# Patient Record
Sex: FEMALE | Race: WHITE | Employment: OTHER | URBAN - METROPOLITAN AREA
[De-identification: names, ages, dates, MRNs, and addresses within clinical notes are randomized per-mention and may not be internally consistent; named-entity substitution may affect disease eponyms.]

---

## 2017-10-09 ENCOUNTER — OFFICE VISIT (OUTPATIENT)
Dept: OBSTETRICS AND GYNECOLOGY | Facility: CLINIC | Age: 67
End: 2017-10-09

## 2017-10-09 VITALS
BODY MASS INDEX: 27.45 KG/M2 | WEIGHT: 160.8 LBS | SYSTOLIC BLOOD PRESSURE: 152 MMHG | DIASTOLIC BLOOD PRESSURE: 92 MMHG | HEIGHT: 64 IN

## 2017-10-09 DIAGNOSIS — N81.11 MIDLINE CYSTOCELE: ICD-10-CM

## 2017-10-09 DIAGNOSIS — M85.80 OSTEOPENIA, UNSPECIFIED LOCATION: ICD-10-CM

## 2017-10-09 DIAGNOSIS — Z78.0 MENOPAUSE: Primary | ICD-10-CM

## 2017-10-09 PROCEDURE — 99213 OFFICE O/P EST LOW 20 MIN: CPT | Performed by: OBSTETRICS & GYNECOLOGY

## 2017-10-09 RX ORDER — ALENDRONATE SODIUM 70 MG/1
70 TABLET ORAL WEEKLY
Qty: 12 TABLET | Refills: 3 | Status: SHIPPED | OUTPATIENT
Start: 2017-10-09 | End: 2018-10-01 | Stop reason: SDUPTHER

## 2017-10-09 RX ORDER — INFLUENZA A VIRUS A/MICHIGAN/45/2015 X-275 (H1N1) ANTIGEN (FORMALDEHYDE INACTIVATED), INFLUENZA A VIRUS A/SINGAPORE/INFIMH-16-0019/2016 IVR-186 (H3N2) ANTIGEN (FORMALDEHYDE INACTIVATED), AND INFLUENZA B VIRUS B/MARYLAND/15/2016 BX-69A (A B/COLORADO/6/2017-LIKE VIRUS) ANTIGEN (FORMALDEHYDE INACTIVATED) 60; 60; 60 UG/.5ML; UG/.5ML; UG/.5ML
INJECTION, SUSPENSION INTRAMUSCULAR
Refills: 0 | COMMUNITY
Start: 2017-10-05 | End: 2018-10-15

## 2017-10-09 NOTE — PROGRESS NOTES
Chief Complaint   Patient presents with   • Gynecologic Exam   • Med Refill       Josselyn Mosley is a 67 y.o. year old  presenting to be seen because of Menopause with osteopenia.  She is currently treated with alendronate 70 mg weekly and has no side effects.  She denies jaw pain or reflux.  She has previously had an AH/BSO.    History   Sexual Activity   • Sexual activity: Yes   • Partners: Male   • Birth control/ protection: Surgical, Post-menopausal                 SCREENING TESTS    Year 2012   Age                         PAP     Neg.                    HPV high risk                         Mammogram    benign benign                    ROBYN score                         Breast MRI                         Lipids                         Vitamin D                         Colonoscopy                         DEXA  Frax (hip/any)   osteopenia                      Ovarian Screen                             No Additional Complaints Reported    The following portions of the patient's history were reviewed and updated as appropriate:vital signs and   She  does not have any pertinent problems on file.  She  has a past surgical history that includes Total abdominal hysterectomy w/ bilateral salpingoophorectomy (Bilateral); Knee cartilage surgery (Left); and Cataract extraction w/ intraocular lens  implant, bilateral.  Her family history is not on file.  She  reports that she has never smoked. She has never used smokeless tobacco. She reports that she does not drink alcohol or use illicit drugs.  Current Outpatient Prescriptions   Medication Sig Dispense Refill   • alendronate (FOSAMAX) 70 MG tablet Take 1 tablet by mouth 1 (One) Time Per Week. 12 tablet 3   • aspirin 81 MG EC tablet Take 81 mg by mouth Daily.     • Calcium-Magnesium-Vitamin D (CALCIUM 1200+D3 PO) Take 1 tablet by mouth Daily.     •  "cholecalciferol (VITAMIN D3) 1000 UNITS tablet Take 1,000 Units by mouth Daily.     • FLUZONE HIGH-DOSE 0.5 ML suspension prefilled syringe injection ADM 0.5ML IM UTD  0   • metFORMIN (GLUCOPHAGE) 500 MG tablet Take 1 tablet by mouth Daily.     • Multiple Vitamins-Minerals (MULTIVITAMIN ADULT PO) Take 1 tablet by mouth Daily.     • NIFEDICAL XL 60 MG 24 hr tablet Take 1 tablet by mouth Daily.     • omeprazole (PriLOSEC) 20 MG capsule Take 1 capsule by mouth Daily.     • simvastatin (ZOCOR) 10 MG tablet Take 1 tablet by mouth Daily.     • valsartan-hydrochlorothiazide (DIOVAN-HCT) 320-12.5 MG per tablet Take 1 tablet by mouth Daily.       No current facility-administered medications for this visit.      She is allergic to levaquin [levofloxacin]..        Review of Systems  A comprehensive review of systems was done.  Constitutional: negative for fever, chills, activity change, appetite change, fatigue and unexpected weight change.  Respiratory: negative  Cardiovascular: negative  Gastrointestinal: negative  Genitourinary:negative  Musculoskeletal:negative  Behavioral/Psych: negative          /92  Ht 64\" (162.6 cm)  Wt 160 lb 12.8 oz (72.9 kg)  LMP  (LMP Unknown)  BMI 27.6 kg/m2    Physical Exam    General:  alert; cooperative; well developed; well nourished   Skin:  No suspicious lesions seen   Thyroid: normal to inspection and palpation   Lungs:  clear to auscultation bilaterally   Heart:  regular rate and rhythm, S1, S2 normal, no murmur, click, rub or gallop   Breasts:  Examined in supine position  Symmetric without masses or skin dimpling  Nipples normal without inversion, lesions or discharge  There are no palpable axillary nodes   Abdomen: soft, non-tender; no masses  no umbilical or inginual hernias are present  no hepato-splenomegaly   Pelvis: Clinical staff was present for exam  External genitalia:  normal appearance of the external genitalia including Bartholin's and [place]'s glands.  Vaginal:  " normal pink mucosa without prolapse or lesions.  Cervix:  absent.  Uterus:  absent.  Adnexa:  absent, bilateral.  Rectal:  anus visually normal appearing. recto-vaginal exam unremarkable and confirms findings;     Lab Review   No data reviewed    Imaging   Mammogram results- benign    Medical counseling was given to patient for the following topics: diagnostic results, instructions for management, risk factor reductions, risks and benefits of treatment options and importance of treatment compliance . Total time of the encounter was 18 minute(s) and 11 minute(s)  was spent in face to face counseling.  I have counseled the patient in fall prevention.  I have counseled the patient and continuation of alendronate weekly.  I have discussed potential side effects with her.  I counseled the patient in monthly self breast assessment and annual breast imaging.          ASSESSMENT  Problems Addressed this Visit        Musculoskeletal and Integument    Osteopenia    Relevant Medications    alendronate (FOSAMAX) 70 MG tablet       Genitourinary    Menopause - Primary    Midline cystocele            PLAN    Medications prescribed this encounter:    New Medications Ordered This Visit   Medications   • FLUZONE HIGH-DOSE 0.5 ML suspension prefilled syringe injection     Sig: ADM 0.5ML IM UTD     Refill:  0   • metFORMIN (GLUCOPHAGE) 500 MG tablet     Sig: Take 1 tablet by mouth Daily.   • alendronate (FOSAMAX) 70 MG tablet     Sig: Take 1 tablet by mouth 1 (One) Time Per Week.     Dispense:  12 tablet     Refill:  3   ·   · Follow up: 12 month(s)  · Calcium, 600 mg/ Vit. D, 400 IU daily     *Please note that portions of this documentation may have been completed with a voice recognition program.  Efforts were made to edit this dictation, but occasional words may have been mistranscribed.     This note was electronically signed.    CLYDE Gray MD  October 9, 2017  2:22 PM

## 2018-10-01 DIAGNOSIS — M85.80 OSTEOPENIA, UNSPECIFIED LOCATION: ICD-10-CM

## 2018-10-01 RX ORDER — ALENDRONATE SODIUM 70 MG/1
TABLET ORAL
Qty: 12 TABLET | Refills: 0 | Status: SHIPPED | OUTPATIENT
Start: 2018-10-01 | End: 2018-10-15 | Stop reason: SDUPTHER

## 2018-10-15 ENCOUNTER — OFFICE VISIT (OUTPATIENT)
Dept: OBSTETRICS AND GYNECOLOGY | Facility: CLINIC | Age: 68
End: 2018-10-15

## 2018-10-15 VITALS
DIASTOLIC BLOOD PRESSURE: 88 MMHG | SYSTOLIC BLOOD PRESSURE: 142 MMHG | BODY MASS INDEX: 28.24 KG/M2 | WEIGHT: 165.4 LBS | HEIGHT: 64 IN

## 2018-10-15 DIAGNOSIS — Z01.419 ENCOUNTER FOR GYNECOLOGICAL EXAMINATION WITHOUT ABNORMAL FINDING: ICD-10-CM

## 2018-10-15 DIAGNOSIS — M85.80 OSTEOPENIA, UNSPECIFIED LOCATION: ICD-10-CM

## 2018-10-15 DIAGNOSIS — Z78.0 MENOPAUSE: Primary | ICD-10-CM

## 2018-10-15 DIAGNOSIS — M85.89 OSTEOPENIA OF MULTIPLE SITES: ICD-10-CM

## 2018-10-15 PROCEDURE — G0101 CA SCREEN;PELVIC/BREAST EXAM: HCPCS | Performed by: OBSTETRICS & GYNECOLOGY

## 2018-10-15 RX ORDER — OLMESARTAN MEDOXOMIL AND HYDROCHLOROTHIAZIDE 40/12.5 40; 12.5 MG/1; MG/1
1 TABLET ORAL DAILY
COMMUNITY
Start: 2018-07-24

## 2018-10-15 RX ORDER — ALENDRONATE SODIUM 70 MG/1
70 TABLET ORAL
Qty: 4 TABLET | Refills: 11 | Status: SHIPPED | OUTPATIENT
Start: 2018-10-15 | End: 2019-10-15

## 2018-10-15 NOTE — PROGRESS NOTES
Chief Complaint   Patient presents with   • Gynecologic Exam   • Otilio Bragg       Josselyn Mosley is a 68 y.o. year old  presenting to be seen for her annual exam.  This patient has previously had an AH/BSO.  She has a history of moderate to severe osteopenia of the spine and hip.  She is currently taking alendronate, 70 mg weekly.  Her DEXA on 10/11/2018 revealed slight worsening at the left hip.  She is exercising and taking calcium with vitamin D.  She has no bowel or urinary symptoms.  She denies abnormal vaginal discharge.    SCREENING TESTS    Year 2012   Age                         PAP                         HPV high risk                         Mammogram      benign                   ROBYN score                         Breast MRI                         Lipids                         Vitamin D                         Colonoscopy                         DEXA  Frax (hip/any)     osteopenia  osteopenia                  Ovarian Screen                             She exercises regularly: yes.  She wears her seat belt: yes.  She has concerns about domestic violence: no.  She has noticed changes in height: no    GYN screening history:  · Last mammogram: was done on approximately 2017 and the result was: Birads II (Benign findings).  · Last DEXA: was done on approximately 10/11/2018 and the results were: osteopenia of hips and osteopenia of spine.    No Additional Complaints Reported    The following portions of the patient's history were reviewed and updated as appropriate:vital signs and   She  has a past medical history of Cystocele; Hemorrhoids; Hyperlipidemia; Hypertension; Menopause; Osteopenia; and Prediabetes.  She  does not have any pertinent problems on file.  She  has a past surgical history that includes Total abdominal hysterectomy w/ bilateral salpingoophorectomy (Bilateral); Knee  "cartilage surgery (Left); and Cataract extraction w/ intraocular lens  implant, bilateral.  Her family history is not on file.  She  reports that she has never smoked. She has never used smokeless tobacco. She reports that she does not drink alcohol or use drugs.  Current Outpatient Prescriptions   Medication Sig Dispense Refill   • alendronate (FOSAMAX) 70 MG tablet Take 1 tablet by mouth Every 7 (Seven) Days. 4 tablet 11   • aspirin 81 MG EC tablet Take 81 mg by mouth Daily.     • Calcium-Magnesium-Vitamin D (CALCIUM 1200+D3 PO) Take 1 tablet by mouth Daily.     • cholecalciferol (VITAMIN D3) 1000 UNITS tablet Take 1,000 Units by mouth Daily.     • metFORMIN (GLUCOPHAGE) 500 MG tablet Take 1 tablet by mouth Daily.     • Multiple Vitamins-Minerals (MULTIVITAMIN ADULT PO) Take 1 tablet by mouth Daily.     • NIFEDICAL XL 60 MG 24 hr tablet Take 1 tablet by mouth Daily.     • olmesartan-hydrochlorothiazide (BENICAR HCT) 40-12.5 MG per tablet Take 1 tablet by mouth Daily.     • omeprazole (PriLOSEC) 20 MG capsule Take 1 capsule by mouth Daily.     • simvastatin (ZOCOR) 10 MG tablet Take 1 tablet by mouth Daily.       No current facility-administered medications for this visit.      She is allergic to levaquin [levofloxacin]..    Review of Systems  A comprehensive review of systems was taken.  Constitutional: negative for fever, chills, activity change, appetite change, fatigue and unexpected weight change.  Respiratory: negative  Cardiovascular: negative  Gastrointestinal: negative  Genitourinary:negative  Musculoskeletal:negative  Behavioral/Psych: negative       /88   Ht 162.6 cm (64\")   Wt 75 kg (165 lb 6.4 oz)   LMP  (LMP Unknown)   BMI 28.39 kg/m²     Physical Exam    General:  alert; cooperative; well developed; well nourished   Skin:  No suspicious lesions seen   Thyroid: normal to inspection and palpation   Lungs:  clear to auscultation bilaterally   Heart:  regular rate and rhythm, S1, S2 normal, no " murmur, click, rub or gallop   Breasts:  Examined in supine position  Symmetric without masses or skin dimpling  Nipples normal without inversion, lesions or discharge  There are no palpable axillary nodes   Abdomen: soft, non-tender; no masses  no umbilical or inginual hernias are present  no hepato-splenomegaly   Pelvis: Clinical staff was present for exam  External genitalia:  normal appearance of the external genitalia including Bartholin's and [place]'s glands.  Vaginal:  normal pink mucosa without prolapse or lesions.  Cervix:  absent.  Uterus:  absent.  Adnexa:  absent, bilateral.  Rectal:  anus visually normal appearing. recto-vaginal exam unremarkable and confirms findings;  Cystocele GRADE 1     Lab Review   No data reviewed    Imaging  Mammogram results- Birads II,  and DEXA- severe osteopenia of the left hip and moderate osteopenia of the  spine         ASSESSMENT  Problems Addressed this Visit        Musculoskeletal and Integument    Osteopenia    Relevant Medications    alendronate (FOSAMAX) 70 MG tablet       Genitourinary    Menopause - Primary      Other Visit Diagnoses     Encounter for gynecological examination without abnormal finding              PLAN    Medications prescribed this encounter:    New Medications Ordered This Visit   Medications   • alendronate (FOSAMAX) 70 MG tablet     Sig: Take 1 tablet by mouth Every 7 (Seven) Days.     Dispense:  4 tablet     Refill:  11   · Repeat DEXA in 2 years  · Monthly self breast assessment and annual breast imaging  · Calcium, 600 mg/ Vit. D, 400 IU daily; regular weight-bearing exercise  · Follow up: 12 month(s)  *Please note that portions of this documentation may have been completed with a voice recognition program.  Efforts were made to edit this dictation, but occasional words may have been mistranscribed.       This note was electronically signed.    CLYDE Gray MD  October 15, 2018  2:21 PM

## 2018-10-15 NOTE — PATIENT INSTRUCTIONS
Fall Prevention in the Home  Falls can cause injuries. They can happen to people of all ages. There are many things you can do to make your home safe and to help prevent falls.  What can I do on the outside of my home?  · Regularly fix the edges of walkways and driveways and fix any cracks.  · Remove anything that might make you trip as you walk through a door, such as a raised step or threshold.  · Trim any bushes or trees on the path to your home.  · Use bright outdoor lighting.  · Clear any walking paths of anything that might make someone trip, such as rocks or tools.  · Regularly check to see if handrails are loose or broken. Make sure that both sides of any steps have handrails.  · Any raised decks and porches should have guardrails on the edges.  · Have any leaves, snow, or ice cleared regularly.  · Use sand or salt on walking paths during winter.  · Clean up any spills in your garage right away. This includes oil or grease spills.  What can I do in the bathroom?  · Use night lights.  · Install grab bars by the toilet and in the tub and shower. Do not use towel bars as grab bars.  · Use non-skid mats or decals in the tub or shower.  · If you need to sit down in the shower, use a plastic, non-slip stool.  · Keep the floor dry. Clean up any water that spills on the floor as soon as it happens.  · Remove soap buildup in the tub or shower regularly.  · Attach bath mats securely with double-sided non-slip rug tape.  · Do not have throw rugs and other things on the floor that can make you trip.  What can I do in the bedroom?  · Use night lights.  · Make sure that you have a light by your bed that is easy to reach.  · Do not use any sheets or blankets that are too big for your bed. They should not hang down onto the floor.  · Have a firm chair that has side arms. You can use this for support while you get dressed.  · Do not have throw rugs and other things on the floor that can make you trip.  What can I do in the  kitchen?  · Clean up any spills right away.  · Avoid walking on wet floors.  · Keep items that you use a lot in easy-to-reach places.  · If you need to reach something above you, use a strong step stool that has a grab bar.  · Keep electrical cords out of the way.  · Do not use floor polish or wax that makes floors slippery. If you must use wax, use non-skid floor wax.  · Do not have throw rugs and other things on the floor that can make you trip.  What can I do with my stairs?  · Do not leave any items on the stairs.  · Make sure that there are handrails on both sides of the stairs and use them. Fix handrails that are broken or loose. Make sure that handrails are as long as the stairways.  · Check any carpeting to make sure that it is firmly attached to the stairs. Fix any carpet that is loose or worn.  · Avoid having throw rugs at the top or bottom of the stairs. If you do have throw rugs, attach them to the floor with carpet tape.  · Make sure that you have a light switch at the top of the stairs and the bottom of the stairs. If you do not have them, ask someone to add them for you.  What else can I do to help prevent falls?  · Wear shoes that:  ? Do not have high heels.  ? Have rubber bottoms.  ? Are comfortable and fit you well.  ? Are closed at the toe. Do not wear sandals.  · If you use a stepladder:  ? Make sure that it is fully opened. Do not climb a closed stepladder.  ? Make sure that both sides of the stepladder are locked into place.  ? Ask someone to hold it for you, if possible.  · Clearly viktor and make sure that you can see:  ? Any grab bars or handrails.  ? First and last steps.  ? Where the edge of each step is.  · Use tools that help you move around (mobility aids) if they are needed. These include:  ? Canes.  ? Walkers.  ? Scooters.  ? Crutches.  · Turn on the lights when you go into a dark area. Replace any light bulbs as soon as they burn out.  · Set up your furniture so you have a clear path.  Avoid moving your furniture around.  · If any of your floors are uneven, fix them.  · If there are any pets around you, be aware of where they are.  · Review your medicines with your doctor. Some medicines can make you feel dizzy. This can increase your chance of falling.  Ask your doctor what other things that you can do to help prevent falls.  This information is not intended to replace advice given to you by your health care provider. Make sure you discuss any questions you have with your health care provider.  Document Released: 10/14/2010 Document Revised: 05/25/2017 Document Reviewed: 01/22/2016  [employer]vier Interactive Patient Education © 2018 [employer]

## 2018-11-08 DIAGNOSIS — M85.89 OSTEOPENIA OF MULTIPLE SITES: Primary | ICD-10-CM

## 2019-10-29 ENCOUNTER — OFFICE VISIT (OUTPATIENT)
Dept: OBSTETRICS AND GYNECOLOGY | Facility: CLINIC | Age: 69
End: 2019-10-29

## 2019-10-29 VITALS
SYSTOLIC BLOOD PRESSURE: 160 MMHG | BODY MASS INDEX: 28.65 KG/M2 | DIASTOLIC BLOOD PRESSURE: 80 MMHG | HEIGHT: 64 IN | WEIGHT: 167.8 LBS

## 2019-10-29 DIAGNOSIS — Z01.419 ENCOUNTER FOR GYNECOLOGICAL EXAMINATION WITHOUT ABNORMAL FINDING: ICD-10-CM

## 2019-10-29 DIAGNOSIS — Z78.0 MENOPAUSE: Primary | ICD-10-CM

## 2019-10-29 DIAGNOSIS — M85.89 OSTEOPENIA OF MULTIPLE SITES: ICD-10-CM

## 2019-10-29 DIAGNOSIS — N81.11 MIDLINE CYSTOCELE: ICD-10-CM

## 2019-10-29 PROCEDURE — G0101 CA SCREEN;PELVIC/BREAST EXAM: HCPCS | Performed by: OBSTETRICS & GYNECOLOGY

## 2019-10-29 RX ORDER — ALENDRONATE SODIUM 70 MG/1
70 TABLET ORAL
COMMUNITY
End: 2019-10-29 | Stop reason: SDUPTHER

## 2019-10-29 RX ORDER — ZOSTER VACCINE RECOMBINANT, ADJUVANTED 50 MCG/0.5
KIT INTRAMUSCULAR
Refills: 0 | COMMUNITY
Start: 2019-10-26 | End: 2023-03-30

## 2019-10-29 RX ORDER — CLOSTRIDIUM TETANI TOXOID ANTIGEN (FORMALDEHYDE INACTIVATED) AND CORYNEBACTERIUM DIPHTHERIAE TOXOID ANTIGEN (FORMALDEHYDE INACTIVATED) 5; 2 [LF]/.5ML; [LF]/.5ML
INJECTION, SUSPENSION INTRAMUSCULAR
Refills: 0 | COMMUNITY
Start: 2019-09-26 | End: 2022-03-29

## 2019-10-29 RX ORDER — INFLUENZA A VIRUS A/MICHIGAN/45/2015 X-275 (H1N1) ANTIGEN (FORMALDEHYDE INACTIVATED), INFLUENZA A VIRUS A/SINGAPORE/INFIMH-16-0019/2016 IVR-186 (H3N2) ANTIGEN (FORMALDEHYDE INACTIVATED), AND INFLUENZA B VIRUS B/MARYLAND/15/2016 BX-69A (A B/COLORADO/6/2017-LIKE VIRUS) ANTIGEN (FORMALDEHYDE INACTIVATED) 60; 60; 60 UG/.5ML; UG/.5ML; UG/.5ML
INJECTION, SUSPENSION INTRAMUSCULAR
Refills: 0 | COMMUNITY
Start: 2019-10-26 | End: 2020-11-11

## 2019-10-29 RX ORDER — ALENDRONATE SODIUM 70 MG/1
70 TABLET ORAL
Qty: 12 TABLET | Refills: 3 | Status: SHIPPED | OUTPATIENT
Start: 2019-10-29 | End: 2020-10-28

## 2019-10-29 NOTE — PROGRESS NOTES
Chief Complaint   Patient presents with   • Gynecologic Exam   • Med Refill       Josselyn Mosley is a 69 y.o. year old  presenting to be seen for her annual exam.  This patient has previously had a AH/BSO.  She does not use estrogen replacement therapy.  She denies menopausal symptoms.  She has a history of severe osteopenia of the left hip and moderate osteopenia of the spine.  She currently takes alendronate, 70 mg by mouth weekly.  She has no side effects on this medication.  She denies jaw pain, reflux, or bone pain.  She has had no atraumatic fractures.  She has been on alendronate for 2 years.  She denies bowel or urinary symptoms.    SCREENING TESTS    Year 2012   Age                         PAP                         HPV high risk                         Mammogram       benign                  ROBYN score                         Breast MRI                         Lipids                         Vitamin D                         Colonoscopy                         DEXA  Frax (hip/any)       osteopenia                  Ovarian Screen                             She exercises regularly: yes.  She wears her seat belt: yes.  She has concerns about domestic violence: no.  She has noticed changes in height: no    GYN screening history:  · Last mammogram: was done on approximately 2018 and the result was: Birads II (Benign findings).  · Last DEXA: was done on approximately 10/2018 and the results were: osteopenia of hips and osteopenia of spine.    No Additional Complaints Reported    The following portions of the patient's history were reviewed and updated as appropriate:vital signs and   She  has a past medical history of Cystocele, Hemorrhoids, Hyperlipidemia, Hypertension, Menopause, Osteopenia, and Prediabetes.  She does not have any pertinent problems on file.  She  has a past surgical history  "that includes Total abdominal hysterectomy w/ bilateral salpingoophorectomy (Bilateral); Knee cartilage surgery (Left); and Cataract extraction w/ intraocular lens  implant, bilateral.  Her family history is not on file.  She  reports that she has never smoked. She has never used smokeless tobacco. She reports that she does not drink alcohol or use drugs.  Current Outpatient Medications   Medication Sig Dispense Refill   • alendronate (FOSAMAX) 70 MG tablet Take 1 tablet by mouth Every 7 (Seven) Days. 12 tablet 3   • aspirin 81 MG EC tablet Take 81 mg by mouth Daily.     • Calcium-Magnesium-Vitamin D (CALCIUM 1200+D3 PO) Take 1 tablet by mouth Daily.     • cholecalciferol (VITAMIN D3) 1000 UNITS tablet Take 1,000 Units by mouth Daily.     • FLUZONE HIGH-DOSE 0.5 ML suspension prefilled syringe injection ADM 0.5ML IM UTD  0   • metFORMIN (GLUCOPHAGE) 500 MG tablet Take 1 tablet by mouth Daily.     • Multiple Vitamins-Minerals (MULTIVITAMIN ADULT PO) Take 1 tablet by mouth Daily.     • NIFEDICAL XL 60 MG 24 hr tablet Take 1 tablet by mouth Daily.     • olmesartan-hydrochlorothiazide (BENICAR HCT) 40-12.5 MG per tablet Take 1 tablet by mouth Daily.     • omeprazole (PriLOSEC) 20 MG capsule Take 1 capsule by mouth Daily.     • SHINGRIX 50 MCG/0.5ML reconstituted suspension ADM 0.5ML IM UTD  0   • simvastatin (ZOCOR) 10 MG tablet Take 1 tablet by mouth Daily.     • TENIVAC 5-2 LFU injection ADM 0.5ML IM UTD  0     No current facility-administered medications for this visit.      She is allergic to levaquin [levofloxacin]..    Review of Systems  A comprehensive review of systems was taken.  Constitutional: negative for fever, chills, activity change, appetite change, fatigue and unexpected weight change.  Respiratory: negative  Cardiovascular: negative  Gastrointestinal: negative  Genitourinary:negative  Musculoskeletal:negative  Behavioral/Psych: negative       /80   Ht 162.6 cm (64\")   Wt 76.1 kg (167 lb 12.8 " oz)   LMP  (LMP Unknown)   Breastfeeding? No   BMI 28.80 kg/m²     Physical Exam    General:  alert; cooperative; well developed; well nourished   Skin:  No suspicious lesions seen   Thyroid: normal to inspection and palpation   Lungs:  clear to auscultation bilaterally   Heart:  regular rate and rhythm, S1, S2 normal, no murmur, click, rub or gallop   Breasts:  Examined in supine position  Symmetric without masses or skin dimpling  Nipples normal without inversion, lesions or discharge  There are no palpable axillary nodes   Abdomen: soft, non-tender; no masses  no umbilical or inguinal hernias are present  no hepato-splenomegaly   Pelvis: Clinical staff was present for exam  External genitalia:  normal appearance of the external genitalia including Bartholin's and [place]'s glands.  Vaginal:  normal pink mucosa without prolapse or lesions.  Cervix:  absent.  Uterus:  absent.  Adnexa:  absent, bilateral.  Rectal:  anus visually normal appearing. recto-vaginal exam unremarkable and confirms findings;     Lab Review   No data reviewed    Imaging  Mammogram results- Birads II, and DEXA results- severe osteopenia of the left hip and moderate osteopenia of the spine         ASSESSMENT  Problems Addressed this Visit        Musculoskeletal and Integument    Osteopenia    Relevant Medications    alendronate (FOSAMAX) 70 MG tablet       Genitourinary    Menopause - Primary    Midline cystocele      Other Visit Diagnoses     Encounter for gynecological examination without abnormal finding                  Substance History:   reports that she has never smoked. She has never used smokeless tobacco.   reports that she does not drink alcohol.   reports that she does not use drugs.    Substance use counseling is not indicated based on patient history.      PLAN    Medications prescribed this encounter:    New Medications Ordered This Visit   Medications   • alendronate (FOSAMAX) 70 MG tablet     Sig: Take 1 tablet by mouth  Every 7 (Seven) Days.     Dispense:  12 tablet     Refill:  3   · Monthly self breast assessment, the patient will have breast imaging every 2 years per her desire.  · Fall prevention information.  Repeat DEXA in October 2020.  · Calcium, 600 mg/ Vit. D, 400 IU daily; regular weight-bearing exercise  · Follow up: 12 month(s)  *Please note that portions of this documentation may have been completed with a voice recognition program.  Efforts were made to edit this dictation, but occasional words may have been mistranscribed.       This note was electronically signed.    CLYDE rGay MD  October 29, 2019  11:26 AM

## 2020-03-13 DIAGNOSIS — M85.89 OSTEOPENIA OF MULTIPLE SITES: ICD-10-CM

## 2020-03-13 RX ORDER — ALENDRONATE SODIUM 70 MG/1
TABLET ORAL
Qty: 4 TABLET | Refills: 0 | OUTPATIENT
Start: 2020-03-13

## 2020-03-13 NOTE — TELEPHONE ENCOUNTER
10/29/19 alendronate 70 mg weekly was prescribed for the patient by Dr. Gray with refills to last one year.  I called the pharmacy and they state that the patient is now using the [facility] location rather than [place] location.  They will call [place] to transfer prescription, and will call back if there is any problem.

## 2020-11-11 ENCOUNTER — OFFICE VISIT (OUTPATIENT)
Dept: OBSTETRICS AND GYNECOLOGY | Facility: CLINIC | Age: 70
End: 2020-11-11

## 2020-11-11 VITALS
DIASTOLIC BLOOD PRESSURE: 84 MMHG | HEIGHT: 64 IN | SYSTOLIC BLOOD PRESSURE: 162 MMHG | WEIGHT: 167.4 LBS | BODY MASS INDEX: 28.58 KG/M2

## 2020-11-11 DIAGNOSIS — N81.11 MIDLINE CYSTOCELE: ICD-10-CM

## 2020-11-11 DIAGNOSIS — M85.89 OSTEOPENIA OF MULTIPLE SITES: ICD-10-CM

## 2020-11-11 DIAGNOSIS — Z01.419 ENCOUNTER FOR GYNECOLOGICAL EXAMINATION WITHOUT ABNORMAL FINDING: ICD-10-CM

## 2020-11-11 DIAGNOSIS — Z78.0 MENOPAUSE: Primary | ICD-10-CM

## 2020-11-11 PROCEDURE — G0101 CA SCREEN;PELVIC/BREAST EXAM: HCPCS | Performed by: OBSTETRICS & GYNECOLOGY

## 2020-11-11 RX ORDER — METFORMIN HYDROCHLORIDE 500 MG/1
1 TABLET, EXTENDED RELEASE ORAL DAILY
COMMUNITY
Start: 2020-10-09

## 2020-11-11 RX ORDER — ALENDRONATE SODIUM 70 MG/1
70 TABLET ORAL
Qty: 12 TABLET | Refills: 3 | Status: SHIPPED | OUTPATIENT
Start: 2020-11-11 | End: 2021-11-11

## 2020-11-11 NOTE — PROGRESS NOTES
Chief Complaint   Patient presents with   • Gynecologic Exam   • Med Refill       Josselyn Mosley is a 70 y.o. year old  presenting to be seen for her annual exam.  This patient is menopausal and does not use systemic estrogen therapy.  She has previously had an abdominal hysterectomy/bilateral salpingo-oophorectomy.  She has osteopenia of the spine and hip and is treated with alendronate, 70 mg by mouth weekly.  Her most recent DEXA showed improvement at the spine and left hip.  She has had no atraumatic fractures.  She has no side effects on alendronate.  She denies bowel or urinary symptoms.    SCREENING TESTS    Year 2012   Age                         PAP     Neg.                    HPV high risk                         Mammogram       benign benign                 ROBYN score                         Breast MRI                         Lipids                         Vitamin D                         Colonoscopy                         DEXA  Frax (hip/any)       osteopenia  osteopenia                Ovarian Screen                             She exercises regularly: yes.  She wears her seat belt: yes.  She has concerns about domestic violence: no.  She has noticed changes in height: no    GYN screening history:  · Last mammogram: was done on approximately 2019 and the result was: Birads II (Benign findings).  · Last DEXA: was done on approximately 2020 and the results were: osteopenia of hip and osteopenia of spine.    No Additional Complaints Reported    The following portions of the patient's history were reviewed and updated as appropriate:vital signs and   She  has a past medical history of Cystocele, Hemorrhoids, Hyperlipidemia, Hypertension, Menopause, Osteopenia, Prediabetes, and Wrist fracture, right.  She does not have any pertinent problems on file.  She  has a past surgical history  that includes Total abdominal hysterectomy w/ bilateral salpingoophorectomy (Bilateral); Knee cartilage surgery (Left); and Cataract extraction w/ intraocular lens  implant, bilateral.  Her family history is not on file.  She  reports that she has never smoked. She has never used smokeless tobacco. She reports that she does not drink alcohol or use drugs.  Current Outpatient Medications   Medication Sig Dispense Refill   • alendronate (Fosamax) 70 MG tablet Take 1 tablet by mouth Every 7 (Seven) Days. 12 tablet 3   • aspirin 81 MG EC tablet Take 81 mg by mouth Daily.     • Calcium-Magnesium-Vitamin D (CALCIUM 1200+D3 PO) Take 1 tablet by mouth Daily.     • cholecalciferol (VITAMIN D3) 1000 UNITS tablet Take 1,000 Units by mouth Daily.     • metFORMIN ER (GLUCOPHAGE-XR) 500 MG 24 hr tablet Take 1 tablet by mouth Daily.     • Multiple Vitamins-Minerals (MULTIVITAMIN ADULT PO) Take 1 tablet by mouth Daily.     • NIFEDICAL XL 60 MG 24 hr tablet Take 1 tablet by mouth Daily.     • olmesartan-hydrochlorothiazide (BENICAR HCT) 40-12.5 MG per tablet Take 1 tablet by mouth Daily.     • omeprazole (PriLOSEC) 20 MG capsule Take 1 capsule by mouth Daily.     • SHINGRIX 50 MCG/0.5ML reconstituted suspension ADM 0.5ML IM UTD  0   • simvastatin (ZOCOR) 10 MG tablet Take 1 tablet by mouth Daily.     • TENIVAC 5-2 LFU injection ADM 0.5ML IM UTD  0     No current facility-administered medications for this visit.      She is allergic to levaquin [levofloxacin]..    Review of Systems  A review of systems was taken.  She denies cough, fever, shortness of breath, and loss of her sense of taste or smell  Constitutional: negative for fever, chills, activity change, appetite change, fatigue and unexpected weight change.  Respiratory: negative  Cardiovascular: negative  Gastrointestinal: negative  Genitourinary:negative  Musculoskeletal:negative  Behavioral/Psych: negative      Counseling/Anticipatory Guidance Discussed: nutrition, physical  "activity, healthy weight, injury prevention, screenings and self-breast exam      /84   Ht 162.6 cm (64\")   Wt 75.9 kg (167 lb 6.4 oz)   LMP  (LMP Unknown)   Breastfeeding No   BMI 28.73 kg/m²     Physical Exam    General:  alert; cooperative; well developed; well nourished   Skin:  No suspicious lesions seen   Thyroid: normal to inspection and palpation   Lungs:  clear to auscultation bilaterally   Heart:  regular rate and rhythm, S1, S2 normal, no murmur, click, rub or gallop   Breasts:  Examined in supine position  Symmetric without masses or skin dimpling  Nipples normal without inversion, lesions or discharge  There are no palpable axillary nodes  Fibrocystic changes are present both breasts without a discrete mass   Abdomen: soft, non-tender; no masses  no umbilical or inguinal hernias are present  no hepato-splenomegaly   Pelvis: Clinical staff was present for exam  External genitalia:  normal appearance of the external genitalia including Bartholin's and [place]'s glands.  Vaginal:  normal pink mucosa without prolapse or lesions.  Cervix:  absent.  Uterus:  absent.  Adnexa:  absent, bilateral.  Rectal:  anus visually normal appearing. recto-vaginal exam unremarkable and confirms findings;  Cystocele GRADE 1     Lab Review   No data reviewed    Imaging  Mammogram results and DEXA        Advance directives- NO (the patient was given information about establishing advanced directives)      ASSESSMENT  Problems Addressed this Visit        Musculoskeletal and Integument    Osteopenia    Relevant Medications    alendronate (Fosamax) 70 MG tablet       Genitourinary    Menopause - Primary    Midline cystocele      Other Visit Diagnoses     Encounter for gynecological examination without abnormal finding          Diagnoses       Codes Comments    Menopause    -  Primary ICD-10-CM: Z78.0  ICD-9-CM: 627.2     Osteopenia of multiple sites     ICD-10-CM: M85.89  ICD-9-CM: 733.90     Midline cystocele     " ICD-10-CM: N81.11  ICD-9-CM: 618.01     Encounter for gynecological examination without abnormal finding     ICD-10-CM: Z01.419  ICD-9-CM: V72.31               Substance History:   reports that she has never smoked. She has never used smokeless tobacco.   reports no history of alcohol use.   reports no history of drug use.    Substance use counseling is not indicated based on patient history.      PLAN    Medications prescribed this encounter:    New Medications Ordered This Visit   Medications   • alendronate (Fosamax) 70 MG tablet     Sig: Take 1 tablet by mouth Every 7 (Seven) Days.     Dispense:  12 tablet     Refill:  3   · Monthly self breast assessment and annual breast imaging  · The patient was given instructions about fall prevention  · Calcium, 600 mg/ Vit. D, 400 IU daily; regular weight-bearing exercise  · Follow up: 12 month(s)  *Please note that portions of this documentation may have been completed with a voice recognition program.  Efforts were made to edit this dictation, but occasional words may have been mistranscribed.       This note was electronically signed.    CLYDE Gray MD  November 11, 2020  11:18 EST

## 2022-03-29 ENCOUNTER — OFFICE VISIT (OUTPATIENT)
Dept: OBSTETRICS AND GYNECOLOGY | Facility: CLINIC | Age: 72
End: 2022-03-29

## 2022-03-29 VITALS
SYSTOLIC BLOOD PRESSURE: 126 MMHG | BODY MASS INDEX: 26.95 KG/M2 | RESPIRATION RATE: 16 BRPM | DIASTOLIC BLOOD PRESSURE: 80 MMHG | WEIGHT: 157 LBS

## 2022-03-29 DIAGNOSIS — Z01.411 ENCOUNTER FOR GYNECOLOGICAL EXAMINATION WITH ABNORMAL FINDING: Primary | ICD-10-CM

## 2022-03-29 DIAGNOSIS — M85.80 OSTEOPENIA, UNSPECIFIED LOCATION: ICD-10-CM

## 2022-03-29 DIAGNOSIS — N81.11 MIDLINE CYSTOCELE: ICD-10-CM

## 2022-03-29 DIAGNOSIS — N39.3 SUI (STRESS URINARY INCONTINENCE, FEMALE): ICD-10-CM

## 2022-03-29 PROCEDURE — 99397 PER PM REEVAL EST PAT 65+ YR: CPT | Performed by: NURSE PRACTITIONER

## 2022-03-29 RX ORDER — CITALOPRAM 10 MG/1
TABLET ORAL
COMMUNITY
Start: 2021-12-21

## 2022-03-29 RX ORDER — CHLORAL HYDRATE 500 MG
2 CAPSULE ORAL DAILY
COMMUNITY
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
## 2023-03-30 ENCOUNTER — OFFICE VISIT (OUTPATIENT)
Dept: OBSTETRICS AND GYNECOLOGY | Facility: CLINIC | Age: 73
End: 2023-03-30
Payer: MEDICARE

## 2023-03-30 VITALS
DIASTOLIC BLOOD PRESSURE: 80 MMHG | BODY MASS INDEX: 27.12 KG/M2 | WEIGHT: 158 LBS | SYSTOLIC BLOOD PRESSURE: 126 MMHG | RESPIRATION RATE: 16 BRPM

## 2023-03-30 DIAGNOSIS — Z01.411 ENCOUNTER FOR GYNECOLOGICAL EXAMINATION WITH ABNORMAL FINDING: Primary | ICD-10-CM

## 2023-03-30 DIAGNOSIS — N39.3 SUI (STRESS URINARY INCONTINENCE, FEMALE): ICD-10-CM

## 2023-03-30 DIAGNOSIS — N81.11 MIDLINE CYSTOCELE: ICD-10-CM

## 2023-03-30 DIAGNOSIS — M85.80 OSTEOPENIA, UNSPECIFIED LOCATION: ICD-10-CM

## 2023-03-30 DIAGNOSIS — M85.88 OTHER SPECIFIED DISORDERS OF BONE DENSITY AND STRUCTURE, OTHER SITE: ICD-10-CM

## 2023-03-30 DIAGNOSIS — N95.2 VAGINAL ATROPHY: ICD-10-CM

## 2023-03-30 RX ORDER — AZITHROMYCIN 250 MG/1
TABLET, FILM COATED ORAL
COMMUNITY
Start: 2023-03-29

## 2023-03-30 RX ORDER — METHYLPREDNISOLONE 4 MG/1
TABLET ORAL
COMMUNITY
Start: 2023-03-29
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
